# Patient Record
Sex: FEMALE | Race: WHITE | URBAN - METROPOLITAN AREA
[De-identification: names, ages, dates, MRNs, and addresses within clinical notes are randomized per-mention and may not be internally consistent; named-entity substitution may affect disease eponyms.]

---

## 2018-02-21 ENCOUNTER — APPOINTMENT (RX ONLY)
Dept: URBAN - METROPOLITAN AREA CLINIC 137 | Facility: CLINIC | Age: 58
Setting detail: DERMATOLOGY
End: 2018-02-21

## 2018-02-21 DIAGNOSIS — Z41.9 ENCOUNTER FOR PROCEDURE FOR PURPOSES OTHER THAN REMEDYING HEALTH STATE, UNSPECIFIED: ICD-10-CM

## 2018-02-21 PROBLEM — I10 ESSENTIAL (PRIMARY) HYPERTENSION: Status: ACTIVE | Noted: 2018-02-21

## 2018-02-21 PROBLEM — E03.9 HYPOTHYROIDISM, UNSPECIFIED: Status: ACTIVE | Noted: 2018-02-21

## 2018-02-21 PROCEDURE — ? DYSPORT (U OR CC)

## 2018-02-21 PROCEDURE — ? BOTOX (U OR CC)

## 2018-02-21 NOTE — PROCEDURE: DYSPORT (U OR CC)
Additional Area 2 Units: 0
Price (Use Numbers Only, No Special Characters Or $): 0.00
Expiration Date (Month Year): 03/31/2018
Lot #: [id number]
Periorbital Skin Units: 7
Post-Care Instructions: Patient instructed to not lie down for 4 hours and limit physical activity for 24 hours. Patient instructed not to travel by airplane for 48 hours.
Dilution (U/0.1 Cc): 10
Consent: Written consent obtained. Risks include but not limited to lid/brow ptosis, bruising, swelling, diplopia, temporary effect, incomplete chemical denervation.
Detail Level: Detailed

## 2018-02-21 NOTE — PROCEDURE: BOTOX (U OR CC)
Document As Units Or Cc?: units
Forehead Units/Cc: 8
Expiration Date (Month Year): 04/2020
Price (Use Numbers Only, No Special Characters Or $): 462.00
Dilution (U/0.1 Cc): 5
Nasal Root Units/Cc: 0
Lot #: [id number]
Consent: Written consent obtained. Risks include but not limited to lid/brow ptosis, bruising, swelling, diplopia, temporary effect, incomplete chemical denervation.
Periorbital Skin Units/Cc: 7
Additional Area 1 Location: Bunny Lines
Glabellar Complex Units: 14
Post-Care Instructions: Patient instructed to not lie down for 4 hours and limit physical activity for 24 hours. Patient instructed not to travel by airplane for 48 hours.
Detail Level: Detailed
Inferior Lateral Orbicularis Oculi Units: 4